# Patient Record
Sex: FEMALE | Race: OTHER | Employment: FULL TIME | URBAN - METROPOLITAN AREA
[De-identification: names, ages, dates, MRNs, and addresses within clinical notes are randomized per-mention and may not be internally consistent; named-entity substitution may affect disease eponyms.]

---

## 2017-12-11 ENCOUNTER — OFFICE VISIT (OUTPATIENT)
Dept: FAMILY MEDICINE CLINIC | Facility: CLINIC | Age: 42
End: 2017-12-11

## 2017-12-11 VITALS
DIASTOLIC BLOOD PRESSURE: 70 MMHG | WEIGHT: 107 LBS | SYSTOLIC BLOOD PRESSURE: 90 MMHG | HEIGHT: 60.5 IN | BODY MASS INDEX: 20.46 KG/M2 | OXYGEN SATURATION: 96 % | HEART RATE: 68 BPM

## 2017-12-11 DIAGNOSIS — Z28.21 IMMUNIZATION NOT CARRIED OUT BECAUSE OF PATIENT REFUSAL: ICD-10-CM

## 2017-12-11 DIAGNOSIS — Z13.31 DEPRESSION SCREENING: ICD-10-CM

## 2017-12-11 DIAGNOSIS — Z12.31 ENCOUNTER FOR SCREENING MAMMOGRAM FOR BREAST CANCER: ICD-10-CM

## 2017-12-11 DIAGNOSIS — Z00.00 ENCOUNTER FOR ROUTINE ADULT HEALTH EXAMINATION WITHOUT ABNORMAL FINDINGS: Primary | ICD-10-CM

## 2017-12-11 PROCEDURE — 99396 PREV VISIT EST AGE 40-64: CPT

## 2017-12-11 NOTE — PROGRESS NOTES
CC: Annual Physical Exam    HPI:   Sasha Reynolds is a 43year old female who presents for a complete physical exam. Symptoms: denies discharge, itching, burning or dysuria, periods are regular. Patient denies any acute complaints at this time.      Wt Readin reflex?  HPV if PAP ASCUS    HPV 16 18/45 Reflex Reflex 16, 18/45 if PAP ASCUS or Negative    Menstrual Status Abnormal Bleeding    Gyn Additional Information NOTE:  The Pap smear is a screening test that aids in the detection of   cervical cancer and its p paralysis, ataxia, numbness or tingling in the extremities,change in bowel or bladder control. HEMATOLOGIC:  Denies anemia, bleeding or bruising. LYMPHATICS:  Denies enlarged nodes or history of splenectomy. PSYCHIATRIC:  Denies depression or anxiety. normal reflexes. ASSESSMENT AND PLAN:   Anita Cortés is a 43year old female who presents for a complete physical exam.  Pap and pelvic deferred. Self breast exam explained.    Health maintenance, will check :   Orders Placed This Encounter      CBC W

## 2017-12-11 NOTE — PATIENT INSTRUCTIONS
Pautas de prevención para mujeres de entre 36 y 52 años de edad  [address] detección y las vacunas son importantes para el manejo de cheek sandra.  La consejería sobre cheek sandra también es esencial. A continuación, verá pautas en relación con esos temas p Clamidia Todas las mujeres con mayor riesgo de infección En los exámenes de donato si tiene riesgo o si tiene síntomas   Depresión Todas las mujeres de audrey inocencia de edad En los exámenes de donato   [address] sexualmente activas con mayor riesgo Hepatitis B Las mujeres con mayor riesgo de infección; hable con cheek proveedor de [employer] dosis a lo ismael de seis meses; la segunda dosis debería aplicarse un mes después de la primera dosis; la tercera dosis debería aplicarse al [place] Uso del tabaco y los efectos que puede tener sobre la sandra Todas las mujeres de audrey inocencia de edad Todos los exámenes   1American Diabetes Association  2American Cancer Society  [place]  Preventive Services Task Force  [address] [employer]  John

## 2017-12-12 ENCOUNTER — NURSE ONLY (OUTPATIENT)
Dept: FAMILY MEDICINE CLINIC | Facility: CLINIC | Age: 42
End: 2017-12-12

## 2017-12-12 DIAGNOSIS — Z00.00 ENCOUNTER FOR ROUTINE ADULT HEALTH EXAMINATION WITHOUT ABNORMAL FINDINGS: Primary | ICD-10-CM

## 2017-12-12 PROCEDURE — 36415 COLL VENOUS BLD VENIPUNCTURE: CPT

## 2017-12-12 NOTE — PROGRESS NOTES
Patient presented to clinic for routine blood work. Orders verified in patient chart. Name and  verified. Patient is fasting. Blood drawn from the left antecubital, tolerated well without complications.

## 2017-12-19 ENCOUNTER — MED REC SCAN ONLY (OUTPATIENT)
Dept: FAMILY MEDICINE CLINIC | Facility: CLINIC | Age: 42
End: 2017-12-19

## 2018-01-14 ENCOUNTER — HOSPITAL ENCOUNTER (OUTPATIENT)
Dept: MAMMOGRAPHY | Facility: HOSPITAL | Age: 43
Discharge: HOME | End: 2018-01-14
Payer: COMMERCIAL

## 2018-01-14 DIAGNOSIS — Z12.31 ENCOUNTER FOR SCREENING MAMMOGRAM FOR BREAST CANCER: ICD-10-CM

## 2018-01-14 PROCEDURE — 77067 SCR MAMMO BI INCL CAD: CPT

## 2018-12-21 ENCOUNTER — OFFICE VISIT (OUTPATIENT)
Dept: FAMILY MEDICINE CLINIC | Facility: CLINIC | Age: 43
End: 2018-12-21
Payer: COMMERCIAL

## 2018-12-21 VITALS
HEIGHT: 60.5 IN | SYSTOLIC BLOOD PRESSURE: 110 MMHG | WEIGHT: 110 LBS | HEART RATE: 65 BPM | DIASTOLIC BLOOD PRESSURE: 70 MMHG | BODY MASS INDEX: 21.04 KG/M2 | OXYGEN SATURATION: 99 %

## 2018-12-21 DIAGNOSIS — Z28.21 INFLUENZA VACCINATION DECLINED BY PATIENT: ICD-10-CM

## 2018-12-21 DIAGNOSIS — Z00.01 ENCOUNTER FOR ROUTINE ADULT HEALTH EXAMINATION WITH ABNORMAL FINDINGS: Primary | ICD-10-CM

## 2018-12-21 DIAGNOSIS — Z12.31 ENCOUNTER FOR SCREENING MAMMOGRAM FOR BREAST CANCER: ICD-10-CM

## 2018-12-21 DIAGNOSIS — Z13.31 DEPRESSION SCREENING: ICD-10-CM

## 2018-12-21 DIAGNOSIS — N93.8 DUB (DYSFUNCTIONAL UTERINE BLEEDING): ICD-10-CM

## 2018-12-21 PROBLEM — Z00.00 ENCOUNTER FOR ROUTINE ADULT HEALTH EXAMINATION WITHOUT ABNORMAL FINDINGS: Status: RESOLVED | Noted: 2017-12-11 | Resolved: 2018-12-21

## 2018-12-21 PROCEDURE — 99212 OFFICE O/P EST SF 10 MIN: CPT

## 2018-12-21 PROCEDURE — 99396 PREV VISIT EST AGE 40-64: CPT

## 2018-12-21 PROCEDURE — 36415 COLL VENOUS BLD VENIPUNCTURE: CPT

## 2018-12-22 NOTE — PATIENT INSTRUCTIONS
Pautas de prevención para mujeres de entre 36 y 52 años de edad  [address] detección y las vacunas son importantes para el manejo de cheek sandra.  La consejería sobre cheek sandra también es esencial. A continuación, verá pautas en relación con esos temas p Clamidia Todas las mujeres con mayor riesgo de infección En los exámenes de donato si tiene riesgo o si tiene síntomas   Depresión Todas las mujeres de audrey inocencia de edad En los exámenes de donato   [address] sexualmente activas con mayor riesgo Hepatitis B Las mujeres con mayor riesgo de infección; hable con cheek proveedor de [employer] dosis a lo ismael de seis meses; la segunda dosis debería aplicarse un mes después de la primera dosis; la tercera dosis debería aplicarse al [place] Uso del tabaco y los efectos que puede tener sobre la sandra Todas las mujeres de audrey inocencia de edad Todos los exámenes   1American Diabetes Association  2American Cancer Society  3[place]  Preventive Services Task Force  [address] [employer]  John

## 2018-12-22 NOTE — PROGRESS NOTES
CC: Annual Physical Exam    HPI:   Leonel Notice is a 37year old female who presents for a complete physical exam. Symptoms: denies discharge, itching, burning or dysuria, periods are regular. Patient complains of heavy periods over the past few months.  Sh chest pain, chest pressure, chest discomfort, palpitations, edema, dyspnea on exertion or at rest.  RESPIRATORY:  Denies shortness of breath, wheezing, cough or sputum.   GASTROINTESTINAL:  Denies abdominal pain, nausea, vomiting, constipation, diarrhea, or tenderness. ABDOMEN:  Soft, nondistended, nontender, bowel sounds normal in all 4 quadrants, no masses, no hepatosplenomegaly. BACK: No tenderness, no spasm, SLR test negative, FROM. : Deferred.   EXTREMITIES:  No edema, no cyanosis, no clubbing, FROM, symptoms worsen or fail to improve and in 1 year for annual physical exam.

## 2019-02-02 ENCOUNTER — HOSPITAL ENCOUNTER (OUTPATIENT)
Dept: ULTRASOUND IMAGING | Facility: HOSPITAL | Age: 44
Discharge: HOME | End: 2019-02-02
Payer: COMMERCIAL

## 2019-02-02 ENCOUNTER — HOSPITAL ENCOUNTER (OUTPATIENT)
Dept: MAMMOGRAPHY | Facility: HOSPITAL | Age: 44
Discharge: HOME | End: 2019-02-02
Payer: COMMERCIAL

## 2019-02-02 DIAGNOSIS — Z12.31 ENCOUNTER FOR SCREENING MAMMOGRAM FOR BREAST CANCER: ICD-10-CM

## 2019-02-02 DIAGNOSIS — N93.8 DUB (DYSFUNCTIONAL UTERINE BLEEDING): ICD-10-CM

## 2019-02-02 PROCEDURE — 76856 US EXAM PELVIC COMPLETE: CPT

## 2019-02-02 PROCEDURE — 76830 TRANSVAGINAL US NON-OB: CPT

## 2019-02-02 PROCEDURE — 77063 BREAST TOMOSYNTHESIS BI: CPT

## 2019-02-02 PROCEDURE — 77067 SCR MAMMO BI INCL CAD: CPT

## 2019-02-04 ENCOUNTER — TELEPHONE (OUTPATIENT)
Dept: FAMILY MEDICINE CLINIC | Facility: CLINIC | Age: 44
End: 2019-02-04

## 2019-02-04 NOTE — TELEPHONE ENCOUNTER
----- Message from Justin Watts MD sent at 2/3/2019  2:38 AM CST -----  Results unremarkable; ok to file.

## 2019-02-07 ENCOUNTER — TELEPHONE (OUTPATIENT)
Dept: FAMILY MEDICINE CLINIC | Facility: CLINIC | Age: 44
End: 2019-02-07

## 2019-02-07 NOTE — TELEPHONE ENCOUNTER
----- Message from Robert Fofana MD sent at 2/5/2019  6:00 AM CST -----  Needs additional imaging evaluation of the L breast; ok to file.

## 2019-02-07 NOTE — TELEPHONE ENCOUNTER
Called pt,  verified, results below given, pt aware mammogram dept will contact her to schedule additional imaging of Left breast

## 2019-02-26 ENCOUNTER — HOSPITAL ENCOUNTER (OUTPATIENT)
Dept: MAMMOGRAPHY | Facility: HOSPITAL | Age: 44
Discharge: HOME | End: 2019-02-26
Payer: COMMERCIAL

## 2019-02-26 ENCOUNTER — HOSPITAL ENCOUNTER (OUTPATIENT)
Dept: ULTRASOUND IMAGING | Facility: HOSPITAL | Age: 44
Discharge: HOME | End: 2019-02-26
Payer: COMMERCIAL

## 2019-02-26 DIAGNOSIS — R92.8 ABNORMAL MAMMOGRAM: ICD-10-CM

## 2019-02-26 PROCEDURE — 77061 BREAST TOMOSYNTHESIS UNI: CPT

## 2019-02-26 PROCEDURE — 76642 ULTRASOUND BREAST LIMITED: CPT

## 2019-02-26 PROCEDURE — 77065 DX MAMMO INCL CAD UNI: CPT

## 2019-02-27 ENCOUNTER — TELEPHONE (OUTPATIENT)
Dept: FAMILY MEDICINE CLINIC | Facility: CLINIC | Age: 44
End: 2019-02-27

## 2019-02-27 NOTE — TELEPHONE ENCOUNTER
----- Message from Erich Garcia MD sent at 2/27/2019 12:21 AM CST -----  Benign cyst in left breast, resume routine yearly screening mammogram; ok to file.

## 2020-07-21 ENCOUNTER — OFFICE VISIT (OUTPATIENT)
Dept: FAMILY MEDICINE CLINIC | Facility: CLINIC | Age: 45
End: 2020-07-21
Payer: COMMERCIAL

## 2020-07-21 VITALS
DIASTOLIC BLOOD PRESSURE: 70 MMHG | BODY MASS INDEX: 19.83 KG/M2 | SYSTOLIC BLOOD PRESSURE: 106 MMHG | HEART RATE: 63 BPM | WEIGHT: 105 LBS | HEIGHT: 61 IN | OXYGEN SATURATION: 97 %

## 2020-07-21 DIAGNOSIS — Z01.419 PAP SMEAR, AS PART OF ROUTINE GYNECOLOGICAL EXAMINATION: Primary | ICD-10-CM

## 2020-07-21 DIAGNOSIS — Z11.3 SCREEN FOR STD (SEXUALLY TRANSMITTED DISEASE): ICD-10-CM

## 2020-07-21 DIAGNOSIS — Z12.31 VISIT FOR SCREENING MAMMOGRAM: ICD-10-CM

## 2020-07-21 DIAGNOSIS — Z00.00 PREVENTATIVE HEALTH CARE: ICD-10-CM

## 2020-07-21 PROCEDURE — 3078F DIAST BP <80 MM HG: CPT | Performed by: INTERNAL MEDICINE

## 2020-07-21 PROCEDURE — 90714 TD VACC NO PRESV 7 YRS+ IM: CPT | Performed by: INTERNAL MEDICINE

## 2020-07-21 PROCEDURE — 36415 COLL VENOUS BLD VENIPUNCTURE: CPT | Performed by: INTERNAL MEDICINE

## 2020-07-21 PROCEDURE — 90471 IMMUNIZATION ADMIN: CPT | Performed by: INTERNAL MEDICINE

## 2020-07-21 PROCEDURE — 3008F BODY MASS INDEX DOCD: CPT | Performed by: INTERNAL MEDICINE

## 2020-07-21 PROCEDURE — 99396 PREV VISIT EST AGE 40-64: CPT | Performed by: INTERNAL MEDICINE

## 2020-07-21 PROCEDURE — 3074F SYST BP LT 130 MM HG: CPT | Performed by: INTERNAL MEDICINE

## 2020-07-22 LAB
ABSOLUTE BASOPHILS: 32 CELLS/UL (ref 0–200)
ABSOLUTE EOSINOPHILS: 24 CELLS/UL (ref 15–500)
ABSOLUTE LYMPHOCYTES: 1612 CELLS/UL (ref 850–3900)
ABSOLUTE MONOCYTES: 411 CELLS/UL (ref 200–950)
ABSOLUTE NEUTROPHILS: 5822 CELLS/UL (ref 1500–7800)
ALBUMIN/GLOBULIN RATIO: 1.4 (CALC) (ref 1–2.5)
ALBUMIN: 4.6 G/DL (ref 3.6–5.1)
ALKALINE PHOSPHATASE: 43 U/L (ref 31–125)
ALT: 12 U/L (ref 6–29)
AST: 18 U/L (ref 10–35)
BASOPHILS: 0.4 %
BILIRUBIN, TOTAL: 0.6 MG/DL (ref 0.2–1.2)
BUN: 12 MG/DL (ref 7–25)
CALCIUM: 9.9 MG/DL (ref 8.6–10.2)
CARBON DIOXIDE: 24 MMOL/L (ref 20–32)
CHLORIDE: 104 MMOL/L (ref 98–110)
CHOL/HDLC RATIO: 2.8 (CALC)
CHOLESTEROL, TOTAL: 178 MG/DL
CREATININE: 0.85 MG/DL (ref 0.5–1.1)
EGFR IF AFRICN AM: 96 ML/MIN/1.73M2
EGFR IF NONAFRICN AM: 83 ML/MIN/1.73M2
EOSINOPHILS: 0.3 %
GLOBULIN: 3.3 G/DL (CALC) (ref 1.9–3.7)
GLUCOSE: 85 MG/DL (ref 65–99)
HDL CHOLESTEROL: 63 MG/DL
HEMATOCRIT: 40.1 % (ref 35–45)
HEMOGLOBIN A1C: 5.4 % OF TOTAL HGB
HEMOGLOBIN: 13 G/DL (ref 11.7–15.5)
LDL-CHOLESTEROL: 100 MG/DL (CALC)
LYMPHOCYTES: 20.4 %
MCH: 28.8 PG (ref 27–33)
MCHC: 32.4 G/DL (ref 32–36)
MCV: 88.7 FL (ref 80–100)
MONOCYTES: 5.2 %
MPV: 12.5 FL (ref 7.5–12.5)
NEUTROPHILS: 73.7 %
NON-HDL CHOLESTEROL: 115 MG/DL (CALC)
PLATELET COUNT: 205 THOUSAND/UL (ref 140–400)
POTASSIUM: 4.2 MMOL/L (ref 3.5–5.3)
PROTEIN, TOTAL: 7.9 G/DL (ref 6.1–8.1)
RDW: 13 % (ref 11–15)
RED BLOOD CELL COUNT: 4.52 MILLION/UL (ref 3.8–5.1)
SODIUM: 137 MMOL/L (ref 135–146)
TRIGLYCERIDES: 64 MG/DL
WHITE BLOOD CELL COUNT: 7.9 THOUSAND/UL (ref 3.8–10.8)

## 2020-07-23 LAB
CHLAMYDIA TRACHOMATIS$RNA, TMA: NOT DETECTED
HPV MRNA E6/E7: NOT DETECTED
NEISSERIA GONORRHOEAE$RNA, TMA: NOT DETECTED

## 2020-07-27 ENCOUNTER — TELEPHONE (OUTPATIENT)
Dept: FAMILY MEDICINE CLINIC | Facility: CLINIC | Age: 45
End: 2020-07-27

## 2020-07-27 NOTE — TELEPHONE ENCOUNTER
Results were discussed with Stella Garcia, recommendations were made and she verbalized understanding.        Adwoa Good MD  7/27/2020 11:06 AM      Please inform patient of her lab results.  Complete metabolic panel and CBC with no significant abnormalitie

## 2020-07-27 NOTE — PROGRESS NOTES
[facility]  Return Patient Progress Note      HPI:   Patient presents with:  Physical: needs pap      Lukeluisito Young is a 39year old female presenting for:    Has a significant  has no past medical history on file.     Results for or Range    CHOLESTEROL, TOTAL 178 <200 mg/dL    HDL CHOLESTEROL 63 > OR = 50 mg/dL    TRIGLYCERIDES 64 <150 mg/dL    LDL-CHOLESTEROL 100 (H) mg/dL (calc)    CHOL/HDLC RATIO 2.8 <5.0 (calc)    NON-HDL CHOLESTEROL 115 <130 mg/dL (calc)   HEMOGLOBIN A1C   Resul Never Used    Alcohol use: No    Drug use: No     Family History:  Family History   Problem Relation Age of Onset   • No Known Problems Father    • No Known Problems Mother    • No Known Problems Son    • No Known Problems Son    • No Known Problems Son and headaches. Hematological: Negative for adenopathy. Does not bruise/bleed easily. Psychiatric/Behavioral: Negative for suicidal ideas, behavioral problems and sleep disturbance. The patient is not nervous/anxious.              PHYSICAL EXAM:    She has normal reflexes. No cranial nerve deficit. Skin: Skin is warm. No rash noted. No erythema. Psychiatric: She has a normal mood and affect.  Her behavior is normal. Judgment and thought content normal.           ASSESSMENT AND PLAN:   Patient is a Amplification      Imaging & Consults:  TETANUS AND DIPHTHERIA, PRESERVE FREE  ANNE MARIE SCREENING BILAT (CPT=77067)        All lab results were explained to patient.         Patient indicates understanding of the above recommendations and agrees to the above erica

## 2020-07-29 ENCOUNTER — HOSPITAL ENCOUNTER (OUTPATIENT)
Dept: MAMMOGRAPHY | Facility: HOSPITAL | Age: 45
Discharge: HOME | End: 2020-07-29
Attending: INTERNAL MEDICINE
Payer: COMMERCIAL

## 2020-07-29 DIAGNOSIS — Z12.31 VISIT FOR SCREENING MAMMOGRAM: ICD-10-CM

## 2020-07-29 PROCEDURE — 77067 SCR MAMMO BI INCL CAD: CPT | Performed by: INTERNAL MEDICINE

## 2020-07-29 PROCEDURE — 77063 BREAST TOMOSYNTHESIS BI: CPT | Performed by: INTERNAL MEDICINE

## 2020-08-03 ENCOUNTER — TELEPHONE (OUTPATIENT)
Dept: FAMILY MEDICINE CLINIC | Facility: CLINIC | Age: 45
End: 2020-08-03

## 2020-08-03 NOTE — TELEPHONE ENCOUNTER
----- Message from Kimi Scott MD sent at 7/31/2020  1:16 PM CDT -----  Please inform of normal mammogram.  Yearly screening recommended.

## 2020-09-21 ENCOUNTER — TELEPHONE (OUTPATIENT)
Dept: FAMILY MEDICINE CLINIC | Facility: CLINIC | Age: 45
End: 2020-09-21

## 2020-09-21 DIAGNOSIS — Z20.822 CLOSE EXPOSURE TO COVID-19 VIRUS: Primary | ICD-10-CM

## 2020-09-21 NOTE — TELEPHONE ENCOUNTER
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 She was in contact with her last Wednesday. Pt has no symptoms but needs to know what to do since she has to go to work. Please call back and advise.

## 2020-09-21 NOTE — TELEPHONE ENCOUNTER
Voicemail not yet set-up. Asymptomatic COVID test placed. Allow 24-48 hours until hospital calls and sets up appt. If denied, we can then determine external testing facilities.   Monitor symptoms for any new development or worsening/progressing symptom

## 2020-09-22 ENCOUNTER — APPOINTMENT (OUTPATIENT)
Dept: LAB | Facility: HOSPITAL | Age: 45
End: 2020-09-22
Attending: INTERNAL MEDICINE
Payer: COMMERCIAL

## 2020-09-22 DIAGNOSIS — Z20.822 CLOSE EXPOSURE TO COVID-19 VIRUS: ICD-10-CM

## 2020-09-25 ENCOUNTER — TELEPHONE (OUTPATIENT)
Dept: FAMILY MEDICINE CLINIC | Facility: CLINIC | Age: 45
End: 2020-09-25

## 2020-09-25 LAB — SARS-COV-2 BY PCR: NOT DETECTED

## 2020-09-25 NOTE — TELEPHONE ENCOUNTER
Cherylyn Osgood, MD   9/25/2020  6:54 AM      Please call patient covid test Is negative     Results were discussed with patient, she verbalized understanding.

## 2020-09-28 ENCOUNTER — VIRTUAL PHONE E/M (OUTPATIENT)
Dept: FAMILY MEDICINE CLINIC | Facility: CLINIC | Age: 45
End: 2020-09-28
Payer: COMMERCIAL

## 2020-09-28 DIAGNOSIS — J06.9 UPPER RESPIRATORY TRACT INFECTION, UNSPECIFIED TYPE: Primary | ICD-10-CM

## 2020-09-28 PROCEDURE — 99212 OFFICE O/P EST SF 10 MIN: CPT | Performed by: INTERNAL MEDICINE

## 2020-10-06 NOTE — PROGRESS NOTES
[facility]  Virtual Telephone Note      HPI:     Marisabel Christy verbally consents to a Virtual/Telephone Check-In visit on 9/28/2020      Patient understands and accepts financial responsibility for any deductible, co-insurance and/o History    Tobacco Use      Smoking status: Never Smoker      Smokeless tobacco: Never Used    Alcohol use: No    Drug use: No     Family History:  Family History   Problem Relation Age of Onset   • No Known Problems Father    • No Known Problems Mother seizures, facial asymmetry, speech difficulty, weakness, light-headedness, numbness and headaches. Hematological: Negative for adenopathy. Does not bruise/bleed easily.    Psychiatric/Behavioral: Negative for suicidal ideas, behavioral problems and sleep decision-making and tests are ordered as detailed in the plan of care above.       MD Tommy  Internal Medicine/Primary Care  [place] [id number]

## 2023-05-04 ENCOUNTER — TELEPHONE (OUTPATIENT)
Dept: INTERNAL MEDICINE CLINIC | Facility: CLINIC | Age: 48
End: 2023-05-04

## 2023-05-04 NOTE — TELEPHONE ENCOUNTER
Called patient to outreach she is due to be seen for a physical.     If patient is seen another provider to please notify the office to update pcp.      Unable to leave message

## 2023-05-11 ENCOUNTER — PATIENT OUTREACH (OUTPATIENT)
Dept: CASE MANAGEMENT | Age: 48
End: 2023-05-11

## 2023-05-26 NOTE — PROCEDURES
The office order for PCP removal request is Approved and finalized on May 26, 2023.     Thanks,  [facility] [employer]

## 2023-08-15 ENCOUNTER — OFFICE VISIT (OUTPATIENT)
Dept: INTERNAL MEDICINE CLINIC | Facility: CLINIC | Age: 48
End: 2023-08-15
Payer: COMMERCIAL

## 2023-08-15 VITALS
SYSTOLIC BLOOD PRESSURE: 102 MMHG | DIASTOLIC BLOOD PRESSURE: 64 MMHG | BODY MASS INDEX: 20.43 KG/M2 | OXYGEN SATURATION: 99 % | HEART RATE: 66 BPM | HEIGHT: 61.71 IN | WEIGHT: 111 LBS

## 2023-08-15 DIAGNOSIS — Z13.1 DIABETES MELLITUS SCREENING: ICD-10-CM

## 2023-08-15 DIAGNOSIS — Z12.31 SCREENING MAMMOGRAM FOR BREAST CANCER: ICD-10-CM

## 2023-08-15 DIAGNOSIS — Z00.00 ANNUAL PHYSICAL EXAM: Primary | ICD-10-CM

## 2023-08-15 DIAGNOSIS — Z12.11 ENCOUNTER FOR SCREENING COLONOSCOPY: ICD-10-CM

## 2023-08-15 PROCEDURE — 3078F DIAST BP <80 MM HG: CPT | Performed by: INTERNAL MEDICINE

## 2023-08-15 PROCEDURE — 3008F BODY MASS INDEX DOCD: CPT | Performed by: INTERNAL MEDICINE

## 2023-08-15 PROCEDURE — 99396 PREV VISIT EST AGE 40-64: CPT | Performed by: INTERNAL MEDICINE

## 2023-08-15 PROCEDURE — 3074F SYST BP LT 130 MM HG: CPT | Performed by: INTERNAL MEDICINE

## 2023-09-23 ENCOUNTER — LAB ENCOUNTER (OUTPATIENT)
Dept: LAB | Facility: HOSPITAL | Age: 48
End: 2023-09-23
Attending: INTERNAL MEDICINE
Payer: COMMERCIAL

## 2023-09-23 LAB
ALBUMIN SERPL-MCNC: 3.9 G/DL (ref 3.4–5)
ALBUMIN/GLOB SERPL: 1.1 {RATIO} (ref 1–2)
ALP LIVER SERPL-CCNC: 45 U/L
ALT SERPL-CCNC: 20 U/L
ANION GAP SERPL CALC-SCNC: 5 MMOL/L (ref 0–18)
AST SERPL-CCNC: 15 U/L (ref 15–37)
BASOPHILS # BLD AUTO: 0.02 X10(3) UL (ref 0–0.2)
BASOPHILS NFR BLD AUTO: 0.3 %
BILIRUB SERPL-MCNC: 0.7 MG/DL (ref 0.1–2)
BUN BLD-MCNC: 11 MG/DL (ref 7–18)
BUN/CREAT SERPL: 12.9 (ref 10–20)
CALCIUM BLD-MCNC: 8.9 MG/DL (ref 8.5–10.1)
CHLORIDE SERPL-SCNC: 108 MMOL/L (ref 98–112)
CHOLEST SERPL-MCNC: 171 MG/DL (ref ?–200)
CO2 SERPL-SCNC: 26 MMOL/L (ref 21–32)
CREAT BLD-MCNC: 0.85 MG/DL
DEPRECATED RDW RBC AUTO: 44.4 FL (ref 35.1–46.3)
EGFRCR SERPLBLD CKD-EPI 2021: 84 ML/MIN/1.73M2 (ref 60–?)
EOSINOPHIL # BLD AUTO: 0.03 X10(3) UL (ref 0–0.7)
EOSINOPHIL NFR BLD AUTO: 0.4 %
ERYTHROCYTE [DISTWIDTH] IN BLOOD BY AUTOMATED COUNT: 13.2 % (ref 11–15)
EST. AVERAGE GLUCOSE BLD GHB EST-MCNC: 111 MG/DL (ref 68–126)
FASTING PATIENT LIPID ANSWER: YES
FASTING STATUS PATIENT QL REPORTED: YES
GLOBULIN PLAS-MCNC: 3.7 G/DL (ref 2.8–4.4)
GLUCOSE BLD-MCNC: 86 MG/DL (ref 70–99)
HBA1C MFR BLD: 5.5 % (ref ?–5.7)
HCT VFR BLD AUTO: 38.3 %
HDLC SERPL-MCNC: 66 MG/DL (ref 40–59)
HGB BLD-MCNC: 12.1 G/DL
IMM GRANULOCYTES # BLD AUTO: 0.02 X10(3) UL (ref 0–1)
IMM GRANULOCYTES NFR BLD: 0.3 %
LDLC SERPL CALC-MCNC: 95 MG/DL (ref ?–100)
LYMPHOCYTES # BLD AUTO: 1.57 X10(3) UL (ref 1–4)
LYMPHOCYTES NFR BLD AUTO: 22.9 %
MCH RBC QN AUTO: 29 PG (ref 26–34)
MCHC RBC AUTO-ENTMCNC: 31.6 G/DL (ref 31–37)
MCV RBC AUTO: 91.8 FL
MONOCYTES # BLD AUTO: 0.38 X10(3) UL (ref 0.1–1)
MONOCYTES NFR BLD AUTO: 5.5 %
NEUTROPHILS # BLD AUTO: 4.83 X10 (3) UL (ref 1.5–7.7)
NEUTROPHILS # BLD AUTO: 4.83 X10(3) UL (ref 1.5–7.7)
NEUTROPHILS NFR BLD AUTO: 70.6 %
NONHDLC SERPL-MCNC: 105 MG/DL (ref ?–130)
OSMOLALITY SERPL CALC.SUM OF ELEC: 287 MOSM/KG (ref 275–295)
PLATELET # BLD AUTO: 179 10(3)UL (ref 150–450)
POTASSIUM SERPL-SCNC: 4.1 MMOL/L (ref 3.5–5.1)
PROT SERPL-MCNC: 7.6 G/DL (ref 6.4–8.2)
RBC # BLD AUTO: 4.17 X10(6)UL
SODIUM SERPL-SCNC: 139 MMOL/L (ref 136–145)
TRIGL SERPL-MCNC: 52 MG/DL (ref 30–149)
VLDLC SERPL CALC-MCNC: 8 MG/DL (ref 0–30)
WBC # BLD AUTO: 6.9 X10(3) UL (ref 4–11)

## 2023-09-23 PROCEDURE — 83036 HEMOGLOBIN GLYCOSYLATED A1C: CPT | Performed by: INTERNAL MEDICINE

## 2023-09-23 PROCEDURE — 80061 LIPID PANEL: CPT | Performed by: INTERNAL MEDICINE

## 2023-09-23 PROCEDURE — 80053 COMPREHEN METABOLIC PANEL: CPT | Performed by: INTERNAL MEDICINE

## 2023-09-23 PROCEDURE — 85025 COMPLETE CBC W/AUTO DIFF WBC: CPT | Performed by: INTERNAL MEDICINE

## 2023-10-10 ENCOUNTER — TELEPHONE (OUTPATIENT)
Dept: INTERNAL MEDICINE CLINIC | Facility: CLINIC | Age: 48
End: 2023-10-10

## 2023-10-10 NOTE — TELEPHONE ENCOUNTER
----- Message from Amara Montiel MD sent at 10/9/2023  4:52 PM CDT -----  Inform that all labs normal

## 2024-09-04 ENCOUNTER — OFFICE VISIT (OUTPATIENT)
Dept: INTERNAL MEDICINE CLINIC | Facility: CLINIC | Age: 49
End: 2024-09-04
Payer: COMMERCIAL

## 2024-09-04 VITALS
BODY MASS INDEX: 20.2 KG/M2 | DIASTOLIC BLOOD PRESSURE: 50 MMHG | OXYGEN SATURATION: 98 % | SYSTOLIC BLOOD PRESSURE: 100 MMHG | WEIGHT: 107 LBS | HEART RATE: 73 BPM | HEIGHT: 61 IN

## 2024-09-04 DIAGNOSIS — Z00.00 ANNUAL PHYSICAL EXAM: Primary | Chronic | ICD-10-CM

## 2024-09-04 DIAGNOSIS — Z12.9 SCREENING FOR CANCER: ICD-10-CM

## 2024-09-04 DIAGNOSIS — Z13.1 DIABETES MELLITUS SCREENING: ICD-10-CM

## 2024-09-04 DIAGNOSIS — N92.6 IRREGULAR MENSES: ICD-10-CM

## 2024-09-04 DIAGNOSIS — Z12.11 SCREEN FOR COLON CANCER: ICD-10-CM

## 2024-09-04 PROCEDURE — 99396 PREV VISIT EST AGE 40-64: CPT | Performed by: INTERNAL MEDICINE

## 2024-09-04 PROCEDURE — 99213 OFFICE O/P EST LOW 20 MIN: CPT | Performed by: INTERNAL MEDICINE

## 2024-09-04 NOTE — PROGRESS NOTES
[facility]  Return Patient Progress Note      HPI:     Chief Complaint   Patient presents with    Physical     Annual physical - not fasting        Mirta Alvarado is a 49 year old female presenting for:    Has a significant  has no past medical history on file.    Here for annual.     Labs:   CMP:  Lab Results   Component Value Date/Time     2023 09:44 AM    K 4.1 2023 09:44 AM     2023 09:44 AM    CO2 26.0 2023 09:44 AM    CREATSERUM 0.85 2023 09:44 AM    CA 8.9 2023 09:44 AM    GLU 86 2023 09:44 AM    TP 7.6 2023 09:44 AM    ALB 3.9 2023 09:44 AM    ALKPHO 45 2023 09:44 AM    AST 15 2023 09:44 AM    ALT 20 2023 09:44 AM    BILT 0.7 2023 09:44 AM        CBC:  Lab Results   Component Value Date    WBC 6.9 2023    HGB 12.1 2023    HCT 38.3 2023    .0 2023    NEPERCENT 70.6 2023    LYPERCENT 22.9 2023    MOPERCENT 5.5 2023    EOPERCENT 0.4 2023    BAPERCENT 0.3 2023    NE 4.83 2023    LYMABS 1.57 2023    MOABSO 0.38 2023    EOABSO 0.03 2023    BAABSO 0.02 2023          Hemoglobin A1C, Microalbumin  Lab Results   Component Value Date/Time    A1C 5.5 2023 09:44 AM        Lipid panel  Lab Results   Component Value Date/Time    CHOLEST 171 2023 09:44 AM    HDL 66 (H) 2023 09:44 AM    TRIG 52 2023 09:44 AM    LDL 95 2023 09:44 AM    NONHDLC 105 2023 09:44 AM        Medications:  No current outpatient medications on file.      PMH:  History reviewed. No pertinent past medical history.      PSH:  Past Surgical History:   Procedure Laterality Date          [facility] proc for pa         Allergies:  No Known Allergies   Social History:  Social History     Socioeconomic History    Marital status:    Tobacco Use    Smoking status: Never    Smokeless tobacco:  Never   Substance and Sexual Activity    Alcohol use: No    Drug use: No   Other Topics Concern    Caffeine Concern No    Exercise No    Seat Belt Yes    Special Diet No    Stress Concern No    Weight Concern No      Family History:  Family History   Problem Relation Age of Onset    No Known Problems Father     No Known Problems Mother     No Known Problems Son     No Known Problems Son     No Known Problems Son     No Known Problems Son     No Known Problems Son     No Known Problems Self     No Known Problems Sister     No Known Problems Daughter     No Known Problems Maternal Grandmother     No Known Problems Paternal Grandmother     No Known Problems Maternal Aunt     No Known Problems Paternal Aunt     No Known Problems Maternal Cousin Female     No Known Problems Maternal Cousin Male     No Known Problems Paternal Cousin Female     No Known Problems Paternal Cousin Male     No Known Problems Other     Breast Cancer Neg     Ovarian Cancer Neg     DCIS Neg     LCIS Neg     BRCA gene + Neg     [employer] Descent Neg               REVIEW OF SYSTEMS:   Review of Systems   Constitutional:  Negative for chills, fatigue, fever and unexpected weight change.   HENT:  Negative for congestion, ear pain, hearing loss, rhinorrhea, sinus pain and sore throat.    Eyes:  Negative for pain, redness and visual disturbance.   Respiratory:  Negative for apnea, cough, chest tightness, shortness of breath and wheezing.    Cardiovascular:  Negative for chest pain, palpitations and leg swelling.   Gastrointestinal:  Negative for abdominal distention, abdominal pain, blood in stool, constipation and nausea.   Endocrine: Negative for cold intolerance, heat intolerance and polyuria.   Genitourinary:  Negative for dysuria, hematuria and urgency.   Musculoskeletal:  Negative for arthralgias, back pain, gait problem, joint swelling, myalgias and neck pain.   Skin:  Negative for rash and wound.   Allergic/Immunologic: Negative for food  allergies and immunocompromised state.   Neurological:  Negative for dizziness, seizures, facial asymmetry, speech difficulty, weakness, light-headedness, numbness and headaches.   Hematological:  Negative for adenopathy. Does not bruise/bleed easily.   Psychiatric/Behavioral:  Negative for behavioral problems, sleep disturbance and suicidal ideas. The patient is not nervous/anxious.             PHYSICAL EXAM:   /50   Pulse 73   Ht 5' 1\" (1.549 m)   Wt 107 lb (48.5 kg)   SpO2 98%   BMI 20.22 kg/m²  Estimated body mass index is 20.22 kg/m² as calculated from the following:    Height as of this encounter: 5' 1\" (1.549 m).    Weight as of this encounter: 107 lb (48.5 kg).     Wt Readings from Last 3 Encounters:   09/04/24 107 lb (48.5 kg)   08/15/23 111 lb (50.3 kg)   07/21/20 105 lb (47.6 kg)       Physical Exam  Vitals reviewed.   Constitutional:       General: She is not in acute distress.     Appearance: She is well-developed.   HENT:      Head: Normocephalic and atraumatic.   Eyes:      Conjunctiva/sclera: Conjunctivae normal.      Pupils: Pupils are equal, round, and reactive to light.   Neck:      Thyroid: No thyromegaly.   Cardiovascular:      Rate and Rhythm: Normal rate and regular rhythm.      Heart sounds: Normal heart sounds, S1 normal and S2 normal. No murmur heard.     No friction rub. No gallop.   Pulmonary:      Effort: Pulmonary effort is normal. No respiratory distress.      Breath sounds: Normal breath sounds. No wheezing or rales.   Chest:      Chest wall: No tenderness.   Abdominal:      General: Bowel sounds are normal. There is no distension.      Palpations: Abdomen is soft. There is no mass.      Tenderness: There is no abdominal tenderness. There is no guarding or rebound.   Musculoskeletal:         General: No tenderness. Normal range of motion.      Cervical back: Normal range of motion.   Lymphadenopathy:      Cervical: No cervical adenopathy.   Skin:     General: Skin is warm.       Findings: No erythema or rash.   Neurological:      Mental Status: She is alert and oriented to person, place, and time.      Cranial Nerves: No cranial nerve deficit.      Deep Tendon Reflexes: Reflexes are normal and symmetric.   Psychiatric:         Behavior: Behavior normal.         Thought Content: Thought content normal.         Judgment: Judgment normal.               ASSESSMENT AND PLAN:   Patient is a 49 year old female who presents primarily presents for:    (Z00.00) Annual physical exam  (primary encounter diagnosis)  Plan: CBC With Differential With Platelet, Comp         Metabolic Panel (14), Lipid Panel          During the annual physical exam I spent extensive time discussing medical history including existing conditions, past surgeries, allergies and medications.  Lifestyle factors discussed including diet, exercise and substance abuse including alcohol and tobacco if warranted.  Counseled on screening tests based on age and underlying risk factors which may include but not limited to blood pressure measurement, cholesterol screening, diabetes screening and prostate cancer screening.  Preventative screenings discussed.  Reviewed immunizations.  Sexual health discussed if appropriate.  Family history reviewed.  I addressed any specific concerns or symptoms that the patient had.        (Z12.11) Screen for colon cancer  Plan: [facility] Referral - In Network          (Z12.9) Screening for cancer  Plan: [facility] TON 2D+3D SCREENING BILAT         (CPT=77067/48896)            (Z13.1) Diabetes mellitus screening  Plan: Hemoglobin A1C            (N92.6) Irregular menses  Plan: TSH W Reflex To Free T4 [E], Iron And Tibc [E],        Ferritin [E], US PELVIS (TRANSABDOMINAL AND         TRANSVAGINAL) (CPT=76856/14780)                      Health Maintenance:    Health Maintenance   Topic Date Due    Colorectal Cancer Screening  Never done    DTaP,Tdap,and Td Vaccines (1 - Tdap) 07/22/2020    Mammogram  07/29/2021     Annual Depression Screening  01/01/2024    Annual Physical  08/15/2024    COVID-19 Vaccine (1 - 2023-24 season) Never done    Influenza Vaccine (1) 10/01/2024    Zoster Vaccines (1 of 2) 01/26/2025    Pap Smear  07/21/2025    Pneumococcal Vaccine: Birth to 64yrs  Aged Out         Meds & Refills for this Visit:  Requested Prescriptions      No prescriptions requested or ordered in this encounter       No orders of the defined types were placed in this encounter.      Imaging & Consults:  GASTRO - INTERNAL  ANNE MARIE TON 2D+3D SCREENING BILAT (CPT=77067/05775)          No follow-ups on file.  Important follow up notes/labs for next visit      Patient indicates understanding of the above recommendations and agrees to the above plan.  Reasurrance and education provided. All questions answered.    Notified to call with any questions, complications, allergies, or worsening or changing symptoms as well as any side effects or complications from the treatments .  Red flags/ ER precautions discussed.    If diagnostic labs or imaging ordered advised patient to contact my office for results  24-48 hours after completion    This note was dictated using dragon speech recognition transcription software.  Typographical and transcription errors may be present.  Please call if any questions.             Tod Hall MD  [facility]

## 2024-09-05 ENCOUNTER — TELEPHONE (OUTPATIENT)
Facility: CLINIC | Age: 49
End: 2024-09-05

## 2024-09-05 ENCOUNTER — OFFICE VISIT (OUTPATIENT)
Facility: CLINIC | Age: 49
End: 2024-09-05

## 2024-09-05 VITALS
SYSTOLIC BLOOD PRESSURE: 99 MMHG | DIASTOLIC BLOOD PRESSURE: 58 MMHG | HEART RATE: 70 BPM | WEIGHT: 108.31 LBS | BODY MASS INDEX: 20.45 KG/M2 | HEIGHT: 61 IN

## 2024-09-05 DIAGNOSIS — Z12.11 SCREEN FOR COLON CANCER: Primary | ICD-10-CM

## 2024-09-05 DIAGNOSIS — Z12.11 COLON CANCER SCREENING: Primary | ICD-10-CM

## 2024-09-05 NOTE — H&P
[facility] - Gastroenterology                                                                                                  Clinic History and Physical     Chief Complaint   Patient presents with    Colonoscopy Screening       Requesting physician or provider: Tod Hall MD    HPI:   Mirta Alvarado is a 49 year old year-old female with history of no sig PMH, who presents for colon cancer screening evaluation.     USED    Patient denies any GI symptoms of nausea, vomiting, dyspepsia, dysphagia, hematemesis, abdominal pain, change in bowel habits, thin stools, hematochezia, or melena.  Additionally there is no weight loss and no reported history of chest pain or shortness of breath.  -no family history of colon cancer.  -no significant constipation issues.    Prior endoscopies:  none    Soc:  -No smoking  -no Etoh    History, Medications, Allergies, ROS:      History reviewed. No pertinent past medical history.   Past Surgical History:   Procedure Laterality Date          [facility] proc for pa        Family Hx:   Family History   Problem Relation Age of Onset    No Known Problems Father     No Known Problems Mother     No Known Problems Son     No Known Problems Son     No Known Problems Son     No Known Problems Son     No Known Problems Son     No Known Problems Self     No Known Problems Sister     No Known Problems Daughter     No Known Problems Maternal Grandmother     No Known Problems Paternal Grandmother     No Known Problems Maternal Aunt     No Known Problems Paternal Aunt     No Known Problems Maternal Cousin Female     No Known Problems Maternal Cousin Male     No Known Problems Paternal Cousin Female     No Known Problems Paternal Cousin Male     No Known Problems Other     Breast Cancer Neg     Ovarian Cancer Neg     DCIS Neg     LCIS Neg     BRCA gene + Neg     Ashkenazi Sabianist  Descent Neg       Social History:   Social History     Socioeconomic History    Marital status:    Tobacco Use    Smoking status: Never    Smokeless tobacco: Never   Substance and Sexual Activity    Alcohol use: No    Drug use: No   Other Topics Concern    Caffeine Concern No    Exercise No    Seat Belt Yes    Special Diet No    Stress Concern No    Weight Concern No        Medications (Active prior to today's visit):  Current Outpatient Medications   Medication Sig Dispense Refill    polyethylene glycol, PEG 3350-KCl-NaBcb-NaCl-NaSulf, 236 g Oral Recon Soln Take 4,000 mL by mouth once for 1 dose. As directed by GI clinic instructions. 4000 mL 0       Allergies:  No Known Allergies    ROS:   CONSTITUTIONAL:  negative for fevers, rigors  EYES:  negative for diplopia   RESPIRATORY:  negative for severe shortness of breath  CARDIOVASCULAR:  negative for crushing sub-sternal chest pain  GASTROINTESTINAL:  see HPI  GENITOURINARY:  negative for dysuria or gross hematuria  INTEGUMENT/BREAST:  SKIN:  negative for jaundice   ALLERGIC/IMMUNOLOGIC:  negative for hay fever  ENDOCRINE:  negative for cold intolerance and heat intolerance  MUSCULOSKELETAL:  negative for joint effusion/severe erythema  BEHAVIOR/PSYCH:  negative for psychotic behavior      PHYSICAL EXAM:   Blood pressure 99/58, pulse 70, height 5' 1\" (1.549 m), weight 108 lb 4.8 oz (49.1 kg), last menstrual period 09/01/2024, not currently breastfeeding.    Gen- Patient appears comfortable and in no acute discomfort  HEENT: the sclera appears anicteric, oropharynx clear, mucus membranes appear moist  CV- regular rate and rhythm, the extremities are warm and well perfused   Lung- Moves air well; No labored breathing  Abdomen- soft, non-tender exam in all quadrants without rigidity or guarding, non-distended, no abnormal bowel sounds noted, no masses are palpated  Skin- No jaundice  Ext: no cyanosis, clubbing or edema is evident.   Neuro- Alert and interactive,  and gross movements of extremities normal    Labs/Imaging:     Patient's labs and imaging were reviewed and discussed with patient today.      .  ASSESSMENT/PLAN:   Mirta Alvarado is a 49 year old year-old female with history of no sig PMH, who presents for colon cancer screening evaluation.    # Average Risk screening: patient is considered average risk for colon cancer (NO family hx of colon cancer) and it is appropriate to proceed with screening colonoscopy. Patient is currently asymptomatic and denies diarrhea, hematochezia, thin-stools or weight loss. We discussed risks/benefits/alternatives to procedure, including CT colonography and stool testing, they want to proceed with colonoscopy.    Recommend:  1. Schedule colonoscopy with [facility] [Diagnosis: Screening]    2.  bowel prep from pharmacy (split Golytely)    3. Continue all medications as normal for your procedure.    4. Read all bowel prep instructions carefully. Bowel prep instructions can also be found online at:  [email or web address]     5. AVOID seeds, nuts, popcorn, raw fruits and vegetables for 3 days before procedure    6. You MAY need to go for COVID testing 72 hours before procedure. The testing team will call you a few days before your procedure to discuss with you if testing is required. If you are asked to go for COVID testing and do not completed the test, the procedure cannot be performed.     7. If you start any NEW medication after your visit today, please notify us. Certain medications (like iron or weight loss medications) will need to be held before the procedure, or the procedure cannot be performed safely.      Colonoscopy consent: I have discussed the risks, benefits, and alternatives to colonoscopy with the patient [who demonstrated understanding], including but not limited to the risks of bleeding, infection, pain, death, as well as the risks of anesthesia and perforation all leading to prolonged hospitalization, surgical  intervention, or even death. I also specifically mentioned the miss rate of colonoscopy of 5-10% in the best of all circumstances. All questions were answered to the patient’s satisfaction. The patient signed informed consent and elected to proceed with colonoscopy with intervention [i.e. polypectomy, stent placement, etc.] as indicated.      Orders This Visit:  No orders of the defined types were placed in this encounter.      Meds This Visit:  Requested Prescriptions     Signed Prescriptions Disp Refills    polyethylene glycol, PEG 3350-KCl-NaBcb-NaCl-NaSulf, 236 g Oral Recon Soln 4000 mL 0     Sig: Take 4,000 mL by mouth once for 1 dose. As directed by GI clinic instructions.       Imaging & Referrals:  None         TERESA Duff MD  Pager: [phone]  9/5/2024

## 2024-09-05 NOTE — PATIENT INSTRUCTIONS
1. Schedule colonoscopy with [facility] [Diagnosis: Screening]    2.  bowel prep from pharmacy (split Golytely)    3. Continue all medications as normal for your procedure.    4. Read all bowel prep instructions carefully. Bowel prep instructions can also be found online at:  [email or web address]     5. AVOID seeds, nuts, popcorn, raw fruits and vegetables for 3 days before procedure    6. You MAY need to go for COVID testing 72 hours before procedure. The testing team will call you a few days before your procedure to discuss with you if testing is required. If you are asked to go for COVID testing and do not completed the test, the procedure cannot be performed.     7. If you start any NEW medication after your visit today, please notify us. Certain medications (like iron or weight loss medications) will need to be held before the procedure, or the procedure cannot be performed safely.

## 2024-09-05 NOTE — TELEPHONE ENCOUNTER
Scheduled for:  Colonoscopy 76789  Provider Name:  Dr. Duff   Date:  1/14/25  Location:[facility]  Sedation:  MAC  Time:  0845 Am ,(pt is aware that Endo will call the day before to confirm arrival time)   Prep:  Golytely  Meds/Allergies Reconciled?:  Physician Reviewed   Diagnosis with codes:  Colon cancer screening Z12.11  Was patient informed to call insurance with codes (Y/N):  Yes  Referral sent?:  Referral was sent at the time of electronic surgical scheduling.  [facility] or [facility] notified?:  I sent an electronic request to Endo Scheduling and received a confirmation today.  Medication Orders:  Pt is aware to NOT take iron pills, herbal meds and diet supplements for 7 days before exam. Also to NOT take any form of alcohol, recreational drugs and any forms of ED meds 24 hours before exam.   Misc Orders:  Patient was informed about the new cancellation policy for his/her procedure. Patient was also given a copy of the cancellation policy at the time of the appointment and verbalized understanding.      Further instructions given by staff:  I provide prep instructions to patient at the time of the appointment and reviewed date and location, patient verbalized that she understood and is aware to call if she has any questions.

## 2024-09-20 ENCOUNTER — LAB ENCOUNTER (OUTPATIENT)
Dept: LAB | Facility: HOSPITAL | Age: 49
End: 2024-09-20
Attending: INTERNAL MEDICINE
Payer: COMMERCIAL

## 2024-09-20 LAB
ALBUMIN SERPL-MCNC: 4.2 G/DL (ref 3.2–4.8)
ALBUMIN/GLOB SERPL: 1.4 {RATIO} (ref 1–2)
ALP LIVER SERPL-CCNC: 44 U/L
ALT SERPL-CCNC: 9 U/L
ANION GAP SERPL CALC-SCNC: 5 MMOL/L (ref 0–18)
AST SERPL-CCNC: 16 U/L (ref ?–34)
BASOPHILS # BLD AUTO: 0.04 X10(3) UL (ref 0–0.2)
BASOPHILS NFR BLD AUTO: 0.6 %
BILIRUB SERPL-MCNC: 0.8 MG/DL (ref 0.3–1.2)
BUN BLD-MCNC: 12 MG/DL (ref 9–23)
BUN/CREAT SERPL: 12.8 (ref 10–20)
CALCIUM BLD-MCNC: 9.1 MG/DL (ref 8.7–10.4)
CHLORIDE SERPL-SCNC: 112 MMOL/L (ref 98–112)
CHOLEST SERPL-MCNC: 166 MG/DL (ref ?–200)
CO2 SERPL-SCNC: 24 MMOL/L (ref 21–32)
CREAT BLD-MCNC: 0.94 MG/DL
DEPRECATED HBV CORE AB SER IA-ACNC: 45 NG/ML
DEPRECATED RDW RBC AUTO: 45.9 FL (ref 35.1–46.3)
EGFRCR SERPLBLD CKD-EPI 2021: 74 ML/MIN/1.73M2 (ref 60–?)
EOSINOPHIL # BLD AUTO: 0.05 X10(3) UL (ref 0–0.7)
EOSINOPHIL NFR BLD AUTO: 0.8 %
ERYTHROCYTE [DISTWIDTH] IN BLOOD BY AUTOMATED COUNT: 14 % (ref 11–15)
EST. AVERAGE GLUCOSE BLD GHB EST-MCNC: 117 MG/DL (ref 68–126)
FASTING PATIENT LIPID ANSWER: YES
FASTING STATUS PATIENT QL REPORTED: YES
GLOBULIN PLAS-MCNC: 3 G/DL (ref 2–3.5)
GLUCOSE BLD-MCNC: 76 MG/DL (ref 70–99)
HBA1C MFR BLD: 5.7 % (ref ?–5.7)
HCT VFR BLD AUTO: 35.5 %
HDLC SERPL-MCNC: 53 MG/DL (ref 40–59)
HGB BLD-MCNC: 11.6 G/DL
IMM GRANULOCYTES # BLD AUTO: 0.02 X10(3) UL (ref 0–1)
IMM GRANULOCYTES NFR BLD: 0.3 %
IRON SATN MFR SERPL: 32 %
IRON SERPL-MCNC: 109 UG/DL
LDLC SERPL CALC-MCNC: 103 MG/DL (ref ?–100)
LYMPHOCYTES # BLD AUTO: 1.86 X10(3) UL (ref 1–4)
LYMPHOCYTES NFR BLD AUTO: 28.7 %
MCH RBC QN AUTO: 29.1 PG (ref 26–34)
MCHC RBC AUTO-ENTMCNC: 32.7 G/DL (ref 31–37)
MCV RBC AUTO: 89.2 FL
MONOCYTES # BLD AUTO: 0.41 X10(3) UL (ref 0.1–1)
MONOCYTES NFR BLD AUTO: 6.3 %
NEUTROPHILS # BLD AUTO: 4.1 X10 (3) UL (ref 1.5–7.7)
NEUTROPHILS # BLD AUTO: 4.1 X10(3) UL (ref 1.5–7.7)
NEUTROPHILS NFR BLD AUTO: 63.3 %
NONHDLC SERPL-MCNC: 113 MG/DL (ref ?–130)
OSMOLALITY SERPL CALC.SUM OF ELEC: 291 MOSM/KG (ref 275–295)
PLATELET # BLD AUTO: 190 10(3)UL (ref 150–450)
POTASSIUM SERPL-SCNC: 4.4 MMOL/L (ref 3.5–5.1)
PROT SERPL-MCNC: 7.2 G/DL (ref 5.7–8.2)
RBC # BLD AUTO: 3.98 X10(6)UL
SODIUM SERPL-SCNC: 141 MMOL/L (ref 136–145)
TIBC SERPL-MCNC: 346 UG/DL (ref 250–425)
TRANSFERRIN SERPL-MCNC: 232 MG/DL (ref 250–380)
TRIGL SERPL-MCNC: 48 MG/DL (ref 30–149)
TSI SER-ACNC: 3.75 MIU/ML (ref 0.55–4.78)
VLDLC SERPL CALC-MCNC: 8 MG/DL (ref 0–30)
WBC # BLD AUTO: 6.5 X10(3) UL (ref 4–11)

## 2024-09-20 PROCEDURE — 84466 ASSAY OF TRANSFERRIN: CPT | Performed by: INTERNAL MEDICINE

## 2024-09-20 PROCEDURE — 82728 ASSAY OF FERRITIN: CPT | Performed by: INTERNAL MEDICINE

## 2024-09-20 PROCEDURE — 83036 HEMOGLOBIN GLYCOSYLATED A1C: CPT | Performed by: INTERNAL MEDICINE

## 2024-09-20 PROCEDURE — 83540 ASSAY OF IRON: CPT | Performed by: INTERNAL MEDICINE

## 2024-09-20 PROCEDURE — 80061 LIPID PANEL: CPT | Performed by: INTERNAL MEDICINE

## 2024-09-20 PROCEDURE — 36415 COLL VENOUS BLD VENIPUNCTURE: CPT | Performed by: INTERNAL MEDICINE

## 2024-09-20 PROCEDURE — 80053 COMPREHEN METABOLIC PANEL: CPT | Performed by: INTERNAL MEDICINE

## 2024-09-20 PROCEDURE — 84443 ASSAY THYROID STIM HORMONE: CPT | Performed by: INTERNAL MEDICINE

## 2024-09-20 PROCEDURE — 85025 COMPLETE CBC W/AUTO DIFF WBC: CPT | Performed by: INTERNAL MEDICINE

## 2024-10-01 RX ORDER — FERROUS SULFATE 325(65) MG
325 TABLET ORAL EVERY OTHER DAY
Qty: 90 TABLET | Refills: 0 | Status: SHIPPED | OUTPATIENT
Start: 2024-10-01

## 2024-10-04 ENCOUNTER — TELEPHONE (OUTPATIENT)
Dept: INTERNAL MEDICINE CLINIC | Facility: CLINIC | Age: 49
End: 2024-10-04

## 2025-01-02 ENCOUNTER — TELEPHONE (OUTPATIENT)
Facility: CLINIC | Age: 50
End: 2025-01-02

## 2025-01-02 NOTE — TELEPHONE ENCOUNTER
Procedure has been canceled. Please see telephone encounter 9/5/24 for documentation.      Left message for patient to call back if she is wanting to reschedule.

## 2025-07-21 ENCOUNTER — OFFICE VISIT (OUTPATIENT)
Age: 50
End: 2025-07-21
Payer: COMMERCIAL

## 2025-07-21 VITALS
HEIGHT: 61 IN | SYSTOLIC BLOOD PRESSURE: 110 MMHG | BODY MASS INDEX: 20 KG/M2 | TEMPERATURE: 97 F | HEART RATE: 65 BPM | DIASTOLIC BLOOD PRESSURE: 70 MMHG | OXYGEN SATURATION: 100 %

## 2025-07-21 DIAGNOSIS — Z12.31 SCREENING MAMMOGRAM FOR BREAST CANCER: ICD-10-CM

## 2025-07-21 DIAGNOSIS — Z12.4 SCREENING FOR CERVICAL CANCER: Primary | ICD-10-CM

## 2025-07-21 DIAGNOSIS — Z00.00 PREVENTATIVE HEALTH CARE: ICD-10-CM

## 2025-07-21 DIAGNOSIS — E61.1 IRON DEFICIENCY: ICD-10-CM

## 2025-07-21 DIAGNOSIS — N92.4 EXCESSIVE BLEEDING IN PREMENOPAUSAL PERIOD: ICD-10-CM

## 2025-07-21 DIAGNOSIS — Z12.11 ENCOUNTER FOR SCREENING FECAL OCCULT BLOOD TESTING: ICD-10-CM

## 2025-07-21 PROCEDURE — 88175 CYTOPATH C/V AUTO FLUID REDO: CPT | Performed by: INTERNAL MEDICINE

## 2025-07-21 PROCEDURE — 87624 HPV HI-RISK TYP POOLED RSLT: CPT | Performed by: INTERNAL MEDICINE

## 2025-07-21 RX ORDER — FERROUS SULFATE 325(65) MG
325 TABLET ORAL EVERY OTHER DAY
Qty: 90 TABLET | Refills: 1 | Status: SHIPPED | OUTPATIENT
Start: 2025-07-21

## 2025-07-21 NOTE — PROGRESS NOTES
[facility] part of [facility]  Return Patient Progress Note      HPI:     Chief Complaint   Patient presents with    Pap     Pap smear        Mirta Alvarado is a 50 year old female presenting for:    Has a significant  has no past medical history on file.    Here for PAP smear.     Has C-scope scheduled in Jan 2025 but missed it due to being out of town.      Reports menorrhagia.  Has not scheduled US as ordered last year of pelvis.        Labs:   CMP:  Lab Results   Component Value Date/Time     09/20/2024 10:08 AM    K 4.4 09/20/2024 10:08 AM     09/20/2024 10:08 AM    CO2 24.0 09/20/2024 10:08 AM    CREATSERUM 0.94 09/20/2024 10:08 AM    CA 9.1 09/20/2024 10:08 AM    GLU 76 09/20/2024 10:08 AM    TP 7.2 09/20/2024 10:08 AM    ALB 4.2 09/20/2024 10:08 AM    ALKPHO 44 09/20/2024 10:08 AM    AST 16 09/20/2024 10:08 AM    ALT 9 (L) 09/20/2024 10:08 AM    BILT 0.8 09/20/2024 10:08 AM    TSH 3.747 09/20/2024 10:08 AM        CBC:  Lab Results   Component Value Date    WBC 6.5 09/20/2024    HGB 11.6 (L) 09/20/2024    HCT 35.5 09/20/2024    .0 09/20/2024    NEPERCENT 63.3 09/20/2024    LYPERCENT 28.7 09/20/2024    MOPERCENT 6.3 09/20/2024    EOPERCENT 0.8 09/20/2024    BAPERCENT 0.6 09/20/2024    NE 4.10 09/20/2024    LYMABS 1.86 09/20/2024    MOABSO 0.41 09/20/2024    EOABSO 0.05 09/20/2024    BAABSO 0.04 09/20/2024          Hemoglobin A1C, Microalbumin  Lab Results   Component Value Date/Time    A1C 5.7 (H) 09/20/2024 10:08 AM        Lipid panel  Lab Results   Component Value Date/Time    CHOLEST 166 09/20/2024 10:08 AM    HDL 53 09/20/2024 10:08 AM    TRIG 48 09/20/2024 10:08 AM     (H) 09/20/2024 10:08 AM    NONHDLC 113 09/20/2024 10:08 AM        Medications:  Current Medications[1]   PMH:  Past Medical History[2]            REVIEW OF SYSTEMS:   Review of Systems   Constitutional:  Negative for chills, fatigue, fever and unexpected weight change.   HENT:  Negative for  congestion, ear pain, hearing loss, rhinorrhea, sinus pain and sore throat.    Eyes:  Negative for pain, redness and visual disturbance.   Respiratory:  Negative for apnea, cough, chest tightness, shortness of breath and wheezing.    Cardiovascular:  Negative for chest pain, palpitations and leg swelling.   Gastrointestinal:  Negative for abdominal distention, abdominal pain, blood in stool, constipation and nausea.   Endocrine: Negative for cold intolerance, heat intolerance and polyuria.   Genitourinary:  Negative for dysuria, hematuria and urgency.   Musculoskeletal:  Negative for arthralgias, back pain, gait problem, joint swelling, myalgias and neck pain.   Skin:  Negative for rash and wound.   Allergic/Immunologic: Negative for food allergies and immunocompromised state.   Neurological:  Negative for dizziness, seizures, facial asymmetry, speech difficulty, weakness, light-headedness, numbness and headaches.   Hematological:  Negative for adenopathy. Does not bruise/bleed easily.   Psychiatric/Behavioral:  Negative for behavioral problems, sleep disturbance and suicidal ideas. The patient is not nervous/anxious.             PHYSICAL EXAM:   /70   Pulse 65   Temp 97.4 °F (36.3 °C)   Ht 5' 1\" (1.549 m)   LMP 09/01/2024 (Approximate)   SpO2 100%   BMI 20.46 kg/m²  Estimated body mass index is 20.46 kg/m² as calculated from the following:    Height as of this encounter: 5' 1\" (1.549 m).    Weight as of 9/5/24: 108 lb 4.8 oz (49.1 kg).     Wt Readings from Last 3 Encounters:   09/05/24 108 lb 4.8 oz (49.1 kg)   09/04/24 107 lb (48.5 kg)   08/15/23 111 lb (50.3 kg)       Physical Exam  Vitals reviewed.   Constitutional:       General: She is not in acute distress.     Appearance: She is well-developed.   HENT:      Head: Normocephalic and atraumatic.   Eyes:      Conjunctiva/sclera: Conjunctivae normal.      Pupils: Pupils are equal, round, and reactive to light.   Neck:      Thyroid: No thyromegaly.    Cardiovascular:      Rate and Rhythm: Normal rate and regular rhythm.      Heart sounds: Normal heart sounds, S1 normal and S2 normal. No murmur heard.     No friction rub. No gallop.   Pulmonary:      Effort: Pulmonary effort is normal. No respiratory distress.      Breath sounds: Normal breath sounds. No wheezing or rales.   Chest:      Chest wall: No tenderness.   Abdominal:      General: Bowel sounds are normal. There is no distension.      Palpations: Abdomen is soft. There is no mass.      Tenderness: There is no abdominal tenderness. There is no guarding or rebound.   Genitourinary:     Comments: Pelvic and PAP completed with MA chaperone in room.    Musculoskeletal:         General: No tenderness. Normal range of motion.      Cervical back: Normal range of motion.   Lymphadenopathy:      Cervical: No cervical adenopathy.   Skin:     General: Skin is warm.      Findings: No erythema or rash.   Neurological:      Mental Status: She is alert and oriented to person, place, and time.      Cranial Nerves: No cranial nerve deficit.      Deep Tendon Reflexes: Reflexes are normal and symmetric.   Psychiatric:         Behavior: Behavior normal.         Thought Content: Thought content normal.         Judgment: Judgment normal.               ASSESSMENT AND PLAN:   Patient is a 50 year old female who presents primarily presents for:    (Z12.4) Screening for cervical cancer  (primary encounter diagnosis)  Plan: ThinPrep PAP Smear, Hpv Dna  High Risk , Thin         Prep Collect            (Z12.11) Encounter for screening fecal occult blood testing  Plan: Occult Blood, Fecal, Immunoassay (Blue cards)         [E]            (Z12.31) Screening mammogram for breast cancer  Plan: [facility] TON 2D+3D SCREENING BILAT         (CPT=77067/98560)            (N92.4) Excessive bleeding in premenopausal period  Plan: US PELVIS (TRANSABDOMINAL AND TRANSVAGINAL)         (CPT=76856/00554)            (Z00.00) Preventative health care  Plan:  CBC With Differential With Platelet, Comp         Metabolic Panel (14), Hemoglobin A1C, Lipid         Panel            (E61.1) Iron deficiency  Plan: FERRITIN [457] [Q], IRON AND TIBC [7573] [Q]                    Health Maintenance:    Health Maintenance   Topic Date Due    Colorectal Cancer Screening  Never done    Mammogram  07/29/2021    COVID-19 Vaccine (5 - 2024-25 season) 09/01/2024    Zoster Vaccines (1 of 2) Never done    Pap Smear  07/21/2025    Annual Physical  09/04/2025    Influenza Vaccine (1) 10/01/2025    DTaP,Tdap,and Td Vaccines (2 - Td or Tdap) 07/21/2035    Annual Depression Screening  Completed    Pneumococcal Vaccine: 50+ Years  Completed    Meningococcal B Vaccine  Aged Out         Meds & Refills for this Visit:  Requested Prescriptions     Signed Prescriptions Disp Refills    Ferrous Sulfate 325 (65 Fe) MG Oral Tab 90 tablet 1     Sig: Take 1 tablet (325 mg total) by mouth every other day.       Orders Placed This Encounter   Procedures    Hpv Dna  High Risk , Thin Prep Collect    Occult Blood, Fecal, Immunoassay (Blue cards) [E]    CBC With Differential With Platelet    Comp Metabolic Panel (14)    Hemoglobin A1C    Lipid Panel    FERRITIN [457] [Q]    IRON AND TIBC [7573] [Q]    TdaP (Adacel, Boostrix) [08026]    Prevnar 20 (PCV20) [96045]    ThinPrep PAP Smear       Imaging & Consults:  TETANUS, DIPHTHERIA TOXOIDS AND ACELLULAR PERTUSIS VACCINE (TDAP), >7 YEARS, IM USE  PCV20 VACCINE FOR INTRAMUSCULAR USE  [facility] TON 2D+3D SCREENING BILAT (CPT=77067/53166)  US PELVIS (TRANSABDOMINAL AND TRANSVAGINAL) (CPT=76856/93565)          No follow-ups on file.  Important follow up notes/labs for next visit      Patient indicates understanding of the above recommendations and agrees to the above plan.  Reasurrance and education provided. All questions answered.    Notified to call with any questions, complications, allergies, or worsening or changing symptoms as well as any side effects or complications  from the treatments .  Red flags/ ER precautions discussed.    If diagnostic labs or imaging ordered advised patient to contact my office for results  24-48 hours after completion    This note was dictated using dragon speech recognition transcription software.  Typographical and transcription errors may be present.  Please call if any questions.       As part of our commitment to providing you with comprehensive, transparent, and timely access to your health information, we adhere to the guidelines set forth by the 21st Century Cures Act. This Act enhances your rights to access your electronic health information and ensures that you can easily obtain your medical records.  Please note that the verbage used in this note is intended for medical documentation and communication and may be interpreted as forthright.  Please do not hesitate to contact my office if you have any questions.            Tod Hall MD  [facility]                         [1]   Current Outpatient Medications   Medication Sig Dispense Refill    Ferrous Sulfate 325 (65 Fe) MG Oral Tab Take 1 tablet (325 mg total) by mouth every other day. 90 tablet 1   [2] History reviewed. No pertinent past medical history.

## 2025-07-22 LAB — HPV E6+E7 MRNA CVX QL NAA+PROBE: NEGATIVE

## 2025-07-24 LAB — PAP HISTORY (OTHER THAN LAST PAP): NORMAL

## 2025-08-07 ENCOUNTER — RESULTS FOLLOW-UP (OUTPATIENT)
Age: 50
End: 2025-08-07

## (undated) NOTE — LETTER
20          Leonides Pratt  :  1975      To Whom It May Concern: This patient was evaluated by our office on 20 . Work status: May return to work full-time, no restrictions.   Of note per her request she tested negative for C

## (undated) NOTE — LETTER
To Whom It May Concern:    Manojalysha Annalise has been under our care regarding ongoing medical issues. Because of this, she has been required to restrict her physical activities.     She may resume her usual activities, including work, on *** with the follow